# Patient Record
Sex: FEMALE
[De-identification: names, ages, dates, MRNs, and addresses within clinical notes are randomized per-mention and may not be internally consistent; named-entity substitution may affect disease eponyms.]

---

## 2024-05-10 ENCOUNTER — APPOINTMENT (OUTPATIENT)
Dept: ORTHOPEDIC SURGERY | Facility: CLINIC | Age: 89
End: 2024-05-10
Payer: MEDICARE

## 2024-05-10 VITALS — SYSTOLIC BLOOD PRESSURE: 185 MMHG | HEART RATE: 64 BPM | DIASTOLIC BLOOD PRESSURE: 80 MMHG

## 2024-05-10 VITALS — WEIGHT: 137 LBS | BODY MASS INDEX: 26.9 KG/M2 | HEIGHT: 60 IN

## 2024-05-10 DIAGNOSIS — M17.12 UNILATERAL PRIMARY OSTEOARTHRITIS, LEFT KNEE: ICD-10-CM

## 2024-05-10 PROCEDURE — 73562 X-RAY EXAM OF KNEE 3: CPT | Mod: LT

## 2024-05-10 PROCEDURE — G2211 COMPLEX E/M VISIT ADD ON: CPT

## 2024-05-10 PROCEDURE — 99203 OFFICE O/P NEW LOW 30 MIN: CPT

## 2024-05-10 RX ORDER — RIVAROXABAN 2.5 MG/1
2.5 TABLET, FILM COATED ORAL
Refills: 0 | Status: ACTIVE | COMMUNITY

## 2024-05-10 RX ORDER — CARVEDILOL 6.25 MG/1
6.25 TABLET, FILM COATED ORAL
Refills: 0 | Status: ACTIVE | COMMUNITY

## 2024-05-10 NOTE — END OF VISIT
[FreeTextEntry3] : I, Dr. Adam Villegas, personally performed the evaluation and management services for this established patient who presents today with a new problem/exacerbation of an existing condition.  The E/M includes conducting the examination, assessing all new/exacerbated conditions, and establishing a new plan of care.  Today, my PA Ralph Sanchez was here to assist with my evaluation and management services of this new problem/exacerbated condition to be followed going forward.

## 2024-05-10 NOTE — HISTORY OF PRESENT ILLNESS
[de-identified] : Patient presents today with daughter for evaluation of left knee pain.  The patient reports of developing knee pain a few months ago.  She was seen by an orthopedist at that time.  She had 2 corticosteroid injections.  Last injection was April 1.  She was also sent for physical therapy.  She reported the initial onset of pain, she had difficulty with walking.  She reported of knee swelling.  She states the pain was terrible.  She required a walker.  After seeing the orthopedist she was sent to the vascular surgeon.  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  No blood clots were found.  There is some concern for arterial perfusion of the lower leg.  She is pending arterial studies.  She states with the treatments that she has had, the knee pain is significantly improved.  She occasionally takes Tylenol.  She is currently on Xarelto.  No past history of blood clots is reported.  She has not had gel injections in the past.  Review of Systems- Constitutional: No fever or chills.  Cardiovascular: No orthopnea or chest pain Pulmonary: No shortness of breath.  GI: No nausea or vomiting or abdominal pain. Musculoskeletal: see HPI  Psychiatric: No anxiety and depression.

## 2024-05-10 NOTE — DISCUSSION/SUMMARY
[de-identified] : 30 minutes was spent ordering tests, reviewing past office notes, examining the patient, discussing the clinical presentation and findings, communicating and explaining the diagnosis, ordering medication, providing orthopedic education and documenting the visit in the electronic medical record.  X-rays taken today reviewed with the patient and daughter.  At this time the patient was shown the mild degenerative changes.  She is clinically improved after 2 corticosteroid injections and outpatient physical therapy.  She feels that she is much better.  At this time she will be observed.  She is given a new prescription for continued therapy.  She will use this for maintenance purposes.  If the condition should worsen she will contact the office.  A new corticosteroid injection may be performed.  She may also be indicated for viscosupplementation.  She will continue with use of Tylenol as needed to avoid anti-inflammatories because of being on Xarelto.  All questions answered to the patient's satisfaction.

## 2024-05-10 NOTE — PHYSICAL EXAM
[de-identified] : The patient is conversive and in no apparent distress. The patient is alert and oriented to person, place, and time. Affect and mood appear normal. The head is normocephalic and atraumatic. Skin shows normal turgor with no evidence of eczema or psoriasis. No respiratory distress noted. Sensation grossly intact. MUSCULOSKELETAL:   SEE BELOW  Left knee exam demonstrates skin is clean, dry intact.  No erythema or ecchymosis.  No scars of the knee.  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  Small knee effusion.  Small Baker's cyst is noted.  Normal temperature.  Neutral alignment.  Range of motion is near 0 degrees of extension to 110 degrees of flexion.  No crepitus.  Minimal laxity with stress testing.  2+ DP pulse.  No open wounds or ulcerations distally. [de-identified] : Three-view left knee x-rays were reviewed.  Some mild blunting of the medial femoral condyle.  Slight narrowing of the medial compartment.  Small osteophyte formation of the patellofemoral joint.  PFJ is otherwise preserved spacing wise.  Postsurgical vascular clips are noted of the medial left leg region.  No fractures.  No knee hardware.